# Patient Record
Sex: FEMALE | Race: WHITE | NOT HISPANIC OR LATINO | Employment: FULL TIME | URBAN - METROPOLITAN AREA
[De-identification: names, ages, dates, MRNs, and addresses within clinical notes are randomized per-mention and may not be internally consistent; named-entity substitution may affect disease eponyms.]

---

## 2023-09-14 ENCOUNTER — TELEPHONE (OUTPATIENT)
Dept: PRIMARY CARE | Facility: CLINIC | Age: 43
End: 2023-09-14
Payer: COMMERCIAL

## 2023-09-14 DIAGNOSIS — Z00.00 ROUTINE GENERAL MEDICAL EXAMINATION AT A HEALTH CARE FACILITY: Primary | ICD-10-CM

## 2023-09-14 DIAGNOSIS — E55.9 VITAMIN D DEFICIENCY: ICD-10-CM

## 2023-10-10 ENCOUNTER — CLINICAL SUPPORT (OUTPATIENT)
Dept: PRIMARY CARE | Facility: CLINIC | Age: 43
End: 2023-10-10
Payer: COMMERCIAL

## 2023-10-10 DIAGNOSIS — E55.9 VITAMIN D DEFICIENCY: ICD-10-CM

## 2023-10-10 DIAGNOSIS — Z00.00 ROUTINE GENERAL MEDICAL EXAMINATION AT A HEALTH CARE FACILITY: ICD-10-CM

## 2023-10-10 LAB
25(OH)D3 SERPL-MCNC: 51 NG/ML (ref 30–100)
ALBUMIN SERPL BCP-MCNC: 4.4 G/DL (ref 3.4–5)
ALP SERPL-CCNC: 45 U/L (ref 33–110)
ALT SERPL W P-5'-P-CCNC: 20 U/L (ref 7–45)
ANION GAP SERPL CALC-SCNC: 12 MMOL/L (ref 10–20)
AST SERPL W P-5'-P-CCNC: 18 U/L (ref 9–39)
BILIRUB SERPL-MCNC: 1.1 MG/DL (ref 0–1.2)
BUN SERPL-MCNC: 11 MG/DL (ref 6–23)
CALCIUM SERPL-MCNC: 9.6 MG/DL (ref 8.6–10.6)
CHLORIDE SERPL-SCNC: 103 MMOL/L (ref 98–107)
CHOLEST SERPL-MCNC: 252 MG/DL (ref 0–199)
CHOLESTEROL/HDL RATIO: 4.6
CO2 SERPL-SCNC: 27 MMOL/L (ref 21–32)
CREAT SERPL-MCNC: 0.82 MG/DL (ref 0.5–1.05)
ERYTHROCYTE [DISTWIDTH] IN BLOOD BY AUTOMATED COUNT: 12.3 % (ref 11.5–14.5)
EST. AVERAGE GLUCOSE BLD GHB EST-MCNC: 100 MG/DL
GFR SERPL CREATININE-BSD FRML MDRD: 90 ML/MIN/1.73M*2
GLUCOSE SERPL-MCNC: 93 MG/DL (ref 74–99)
HBA1C MFR BLD: 5.1 %
HCT VFR BLD AUTO: 42.7 % (ref 36–46)
HDLC SERPL-MCNC: 54.9 MG/DL
HGB BLD-MCNC: 13.6 G/DL (ref 12–16)
LDLC SERPL CALC-MCNC: 162 MG/DL (ref 140–190)
MCH RBC QN AUTO: 31.1 PG (ref 26–34)
MCHC RBC AUTO-ENTMCNC: 31.9 G/DL (ref 32–36)
MCV RBC AUTO: 98 FL (ref 80–100)
NON HDL CHOLESTEROL: 197 MG/DL (ref 0–149)
NRBC BLD-RTO: 0 /100 WBCS (ref 0–0)
PLATELET # BLD AUTO: 336 X10*3/UL (ref 150–450)
PMV BLD AUTO: 9.6 FL (ref 7.5–11.5)
POTASSIUM SERPL-SCNC: 4.2 MMOL/L (ref 3.5–5.3)
PROT SERPL-MCNC: 7.2 G/DL (ref 6.4–8.2)
RBC # BLD AUTO: 4.38 X10*6/UL (ref 4–5.2)
SODIUM SERPL-SCNC: 138 MMOL/L (ref 136–145)
TRIGL SERPL-MCNC: 174 MG/DL (ref 0–149)
TSH SERPL-ACNC: 2.96 MIU/L (ref 0.44–3.98)
VIT B12 SERPL-MCNC: 370 PG/ML (ref 211–911)
VLDL: 35 MG/DL (ref 0–40)
WBC # BLD AUTO: 5.7 X10*3/UL (ref 4.4–11.3)

## 2023-10-10 PROCEDURE — 83036 HEMOGLOBIN GLYCOSYLATED A1C: CPT

## 2023-10-10 PROCEDURE — 82607 VITAMIN B-12: CPT

## 2023-10-10 PROCEDURE — 36415 COLL VENOUS BLD VENIPUNCTURE: CPT

## 2023-10-10 PROCEDURE — 80053 COMPREHEN METABOLIC PANEL: CPT

## 2023-10-10 PROCEDURE — 85027 COMPLETE CBC AUTOMATED: CPT

## 2023-10-10 PROCEDURE — 84443 ASSAY THYROID STIM HORMONE: CPT

## 2023-10-10 PROCEDURE — 80061 LIPID PANEL: CPT

## 2023-10-10 PROCEDURE — 82306 VITAMIN D 25 HYDROXY: CPT

## 2023-10-11 ENCOUNTER — OFFICE VISIT (OUTPATIENT)
Dept: PRIMARY CARE | Facility: CLINIC | Age: 43
End: 2023-10-11
Payer: COMMERCIAL

## 2023-10-11 VITALS
DIASTOLIC BLOOD PRESSURE: 72 MMHG | HEIGHT: 67 IN | OXYGEN SATURATION: 98 % | SYSTOLIC BLOOD PRESSURE: 120 MMHG | WEIGHT: 161 LBS | BODY MASS INDEX: 25.27 KG/M2 | HEART RATE: 64 BPM

## 2023-10-11 DIAGNOSIS — Z23 ENCOUNTER FOR IMMUNIZATION: ICD-10-CM

## 2023-10-11 DIAGNOSIS — Z12.11 ENCOUNTER FOR SCREENING FOR MALIGNANT NEOPLASM OF COLON: ICD-10-CM

## 2023-10-11 DIAGNOSIS — E78.2 MODERATE MIXED HYPERLIPIDEMIA NOT REQUIRING STATIN THERAPY: ICD-10-CM

## 2023-10-11 DIAGNOSIS — E03.9 PRIMARY HYPOTHYROIDISM: ICD-10-CM

## 2023-10-11 DIAGNOSIS — Z12.31 VISIT FOR SCREENING MAMMOGRAM: ICD-10-CM

## 2023-10-11 DIAGNOSIS — Z00.00 ANNUAL PHYSICAL EXAM: Primary | ICD-10-CM

## 2023-10-11 PROBLEM — N92.6 IRREGULAR MENSTRUAL CYCLE: Status: ACTIVE | Noted: 2023-10-11

## 2023-10-11 PROBLEM — H72.91 TYMPANIC MEMBRANE PERFORATION, RIGHT: Status: ACTIVE | Noted: 2023-10-11

## 2023-10-11 PROCEDURE — 99396 PREV VISIT EST AGE 40-64: CPT | Performed by: FAMILY MEDICINE

## 2023-10-11 PROCEDURE — 90686 IIV4 VACC NO PRSV 0.5 ML IM: CPT | Performed by: FAMILY MEDICINE

## 2023-10-11 PROCEDURE — 36415 COLL VENOUS BLD VENIPUNCTURE: CPT

## 2023-10-11 PROCEDURE — 90471 IMMUNIZATION ADMIN: CPT | Performed by: FAMILY MEDICINE

## 2023-10-11 PROCEDURE — 82172 ASSAY OF APOLIPOPROTEIN: CPT

## 2023-10-11 PROCEDURE — 1036F TOBACCO NON-USER: CPT | Performed by: FAMILY MEDICINE

## 2023-10-11 RX ORDER — CHOLECALCIFEROL (VITAMIN D3) 25 MCG
1000 TABLET ORAL
COMMUNITY
Start: 2013-12-01

## 2023-10-11 RX ORDER — CALCIUM CARBONATE/VITAMIN D3 600 MG-10
TABLET ORAL
COMMUNITY

## 2023-10-11 RX ORDER — LEVONORGESTREL AND ETHINYL ESTRADIOL 0.15-0.03
KIT ORAL
COMMUNITY
Start: 2018-04-09 | End: 2024-03-04

## 2023-10-11 RX ORDER — LEVOTHYROXINE SODIUM 88 UG/1
1 TABLET ORAL DAILY
COMMUNITY
Start: 2014-08-05 | End: 2023-10-11 | Stop reason: SDUPTHER

## 2023-10-11 RX ORDER — LEVOTHYROXINE SODIUM 88 UG/1
88 TABLET ORAL DAILY
Qty: 90 TABLET | Refills: 3 | Status: SHIPPED | OUTPATIENT
Start: 2023-10-11 | End: 2023-11-27

## 2023-10-11 RX ORDER — MAGNESIUM GLUCONATE 27 MG(500)
27 TABLET ORAL 2 TIMES DAILY
COMMUNITY

## 2023-10-11 RX ORDER — MULTIVITAMIN
TABLET ORAL
COMMUNITY
Start: 2009-08-18

## 2023-10-11 ASSESSMENT — PATIENT HEALTH QUESTIONNAIRE - PHQ9
1. LITTLE INTEREST OR PLEASURE IN DOING THINGS: NOT AT ALL
2. FEELING DOWN, DEPRESSED OR HOPELESS: NOT AT ALL
SUM OF ALL RESPONSES TO PHQ9 QUESTIONS 1 AND 2: 0

## 2023-10-11 ASSESSMENT — COLUMBIA-SUICIDE SEVERITY RATING SCALE - C-SSRS
2. HAVE YOU ACTUALLY HAD ANY THOUGHTS OF KILLING YOURSELF?: NO
1. IN THE PAST MONTH, HAVE YOU WISHED YOU WERE DEAD OR WISHED YOU COULD GO TO SLEEP AND NOT WAKE UP?: NO

## 2023-10-11 ASSESSMENT — ENCOUNTER SYMPTOMS
OCCASIONAL FEELINGS OF UNSTEADINESS: 0
DEPRESSION: 0
LOSS OF SENSATION IN FEET: 0

## 2023-10-11 NOTE — PROGRESS NOTES
"Subjective   Patient ID: Marina Henderson is a 43 y.o. female who presents for Annual Exam.    Last Annual Physical: Sept 2022  Last Dental Visit: July 2023  Last Eye exam: Sept 2023   Hearing Concerns: No   Diet: Well- balanced   Exercise Routine: Regular exercise       HPI   The patient reports that she is taking levothyroxine as prescribed for hypothyroidism as prescribed and is tolerating it well. Blood work shows that her cholesterol is elevated and is taking oral contraceptive pills as form of birth control.     Review of Systems  Constitutional: No fever or chills, No Night Sweats  Eyes: No Blurry Vision or Eye sight problems  ENT: No Nasal Discharge, Hoarseness, sore throat  Cardiovascular: no chest pain, no palpitations and no syncope.   Respiratory: no cough, no shortness of breath during exertion and no shortness of breath at rest.   Gastrointestinal: no abdominal pain, no nausea and no vomiting.   : No vaginal discharge, burning with urination, no blood in urine or stools  Skin: No Skin rashes or Lesions  Neuro: No Headache, no dizziness or Numbness or tingling  Psych: No Anxiety, depression or sleeping problems  Heme: No Easy bleeding or brusing.     Objective   /72   Pulse 64   Ht 1.708 m (5' 7.25\")   Wt 73 kg (161 lb)   SpO2 98%   BMI 25.03 kg/m²     Physical Exam  Patient declined Chaperone  Constitutional: Alert and in no acute distress. Well developed, well nourished.   Head and Face: Head and face: Normal.    Eyes: Normal external exam. Pupils were equal in size, round, reactive to light (PERRL) with normal accommodation and extraocular movements intact (EOMI).   Ears, Nose, Mouth, and Throat: External inspection of ears and nose: Normal.  Hearing: Normal.  Nasal mucosa, septum, and turbinates: Normal.  Lips, teeth, and gums: Normal.  Oropharynx: Normal.   Neck: No neck mass was observed. Supple. Thyroid not enlarged and there were no palpable thyroid nodules.   Cardiovascular: Heart " rate and rhythm were normal, normal S1 and S2. Pedal pulses: Normal. No peripheral edema.   Pulmonary: No respiratory distress. Clear bilateral breath sounds.   Breast: Normal Appearance, No Masses or lumps palpated  Abdomen: Soft nontender; no abdominal mass palpated. Normal bowel sounds. No organomegaly.   Musculoskeletal: No joint swelling seen, normal movements of all extremities. Range of motion: Normal.  Muscle strength/tone: Normal.    Skin: Normal skin color and pigmentation, normal skin turgor, and no rash.   Neurologic: Deep tendon reflexes were 2+ and symmetric.   Psychiatric: Judgment and insight: Intact. Mood and affect: Normal.  Lymphatic: No cervical lymphadenopathy. Palpation of lymph nodes in axillae: Normal.  Palpation of lymph nodes in groin: Normal.    Lab Results   Component Value Date    WBC 5.7 10/10/2023    HGB 13.6 10/10/2023    HCT 42.7 10/10/2023     10/10/2023    CHOL 252 (H) 10/10/2023    TRIG 174 (H) 10/10/2023    HDL 54.9 10/10/2023    ALT 20 10/10/2023    AST 18 10/10/2023     10/10/2023    K 4.2 10/10/2023     10/10/2023    CREATININE 0.82 10/10/2023    BUN 11 10/10/2023    CO2 27 10/10/2023    TSH 2.96 10/10/2023    HGBA1C 5.1 10/10/2023       BI mammo bilateral screening tomosynthesis  MRN: [id number]  Patient Name: GERRY DOMINGUEZ     STUDY:  DIGITAL MAMM SCREENING W/ FREDY;  10/19/2022 8:54 am     ACCESSION NUMBER(S):  [id number]     ORDERING CLINICIAN:  SIVAKUMAR CANNON     INDICATION:  Screening. Benign right breast biopsy.Family history of breast cancer.     COMPARISON:  10/14/2021 and 10/13/2020.     FINDINGS:  2D and tomosynthesis images were reviewed at 1 mm slice thickness.     The breast tissue is heterogeneously dense, which may obscure small  masses.  A right breast biopsy clip is present. No suspicious masses  or calcifications are identified.     IMPRESSION:  No mammographic evidence of malignancy.     BI-RADS CATEGORY:     Category: 1 -  Negative.  Recommendation: 1 Year Screening.     For any future breast imaging appointments, please call [phone]).     Patient letter sent SNORM     I personally reviewed the images/study and I agree with the findings  as stated by Dr. Beltran.      Assessment/Plan   Diagnoses and all orders for this visit:  Annual physical exam  Primary hypothyroidism  -     levothyroxine (Synthroid, Levoxyl) 88 mcg tablet; Take 1 tablet (88 mcg) by mouth once daily.  Visit for screening mammogram  -     BI mammo bilateral screening tomosynthesis; Future  Encounter for immunization  -     Flu vaccine (IIV4) age 6 months and greater, preservative free  Encounter for screening for malignant neoplasm of colon  -     Colonoscopy Screening; Future  Moderate mixed hyperlipidemia not requiring statin therapy  -     Lipoprotein a; Future        Dear Marina Henderson     It was my pleasure to take care of you today in the office. Below are the things we discussed today:    1. 1. Immunizations: Yearly Flu shot is recommended. Given today         a: COVID: Please get booster from the pharmacy          b: Tetanus: Up-to-date    2. Blood Work: Reviewed   3. Seen your dentist twice a year  4. Yearly Eye exam is recommended    5. BMI: Normal  6: Diet recommendations:   Eat Clean, Try to have as many home cooked meals as possible  Avoid processed foods which contain excess calories, sugar, and sodium.    7. Exercise recommendations:   150 minutes a week to maintain your weight     If you have to loose weight, you need a better diet and exercise plan.     8. Supplements recommended:  a - Calcium 600 mg up to twice a day to get a total of 1200 mg. Each 8 oz of milk or yogurt or 1 oz of cheese, 1 Banana, 1 serving of green Leafy vegetable has about 300 mg of Calcium, so you may subtract that amount. Calcium citrate is the only acceptable supplement to take if you take an acid suppressing medication like Prilosec; otherwise Calcium  carbonate is acceptable too (It can cause Constipation).   b - Vitamin D - 2000 IU daily     9. Please get your Living will / Advance directive completed if you do not have one already. Please make sure our office has a copy of the latest one.     10. Colonoscopy: Ordered   11. Mammogram: Uptodate, ordered for Oct 2023  12. PAP: Last done in Jan 2022. Cytology and HPV negative   13. Skin Check: Please see Dermatology once a year for a Skin Check.     14. Hypothyroidism: Continue Levothyroxine.     15. Vitamin B-12 deficiency: Take over-the-counter Vitamin B-12 supplement.    16. Hyperlipidemia: Lipoprotein A ordered.    17. Birth control: Continue Ocps.    Follow up in one year for a Physical. Please call the office before your Physical to see if you need blood work completed prior to your physical.     Please call me if any questions arise from now until your next visit. I will call you after I am done seeing patients. A Doctor is always available by phone when the office is closed. Please feel free to call for help with any problem that you feel shouldn't wait until the office re-opens.     Scribe Attestation  By signing my name below, IRadha Scribe   attest that this documentation has been prepared under the direction and in the presence of Elizabeth Diaz MD.

## 2023-10-13 DIAGNOSIS — Z12.11 COLON CANCER SCREENING: ICD-10-CM

## 2023-10-13 LAB — LPA SERPL-MCNC: <6 MG/DL

## 2023-10-15 RX ORDER — POLYETHYLENE GLYCOL 3350, SODIUM SULFATE ANHYDROUS, SODIUM BICARBONATE, SODIUM CHLORIDE, POTASSIUM CHLORIDE 236; 22.74; 6.74; 5.86; 2.97 G/4L; G/4L; G/4L; G/4L; G/4L
4000 POWDER, FOR SOLUTION ORAL ONCE
Qty: 4000 ML | Refills: 0 | Status: SHIPPED | OUTPATIENT
Start: 2023-10-15 | End: 2023-10-15

## 2023-10-24 DIAGNOSIS — E78.2 MODERATE MIXED HYPERLIPIDEMIA NOT REQUIRING STATIN THERAPY: Primary | ICD-10-CM

## 2023-11-15 ENCOUNTER — ANCILLARY PROCEDURE (OUTPATIENT)
Dept: RADIOLOGY | Facility: CLINIC | Age: 43
End: 2023-11-15
Payer: COMMERCIAL

## 2023-11-15 VITALS — HEIGHT: 67 IN | WEIGHT: 160.94 LBS | BODY MASS INDEX: 25.26 KG/M2

## 2023-11-15 DIAGNOSIS — Z12.31 VISIT FOR SCREENING MAMMOGRAM: ICD-10-CM

## 2023-11-15 PROCEDURE — 77063 BREAST TOMOSYNTHESIS BI: CPT

## 2023-11-15 PROCEDURE — 77067 SCR MAMMO BI INCL CAD: CPT | Performed by: RADIOLOGY

## 2023-11-15 PROCEDURE — 77063 BREAST TOMOSYNTHESIS BI: CPT | Performed by: RADIOLOGY

## 2023-11-15 PROCEDURE — 77067 SCR MAMMO BI INCL CAD: CPT

## 2023-11-17 ENCOUNTER — APPOINTMENT (OUTPATIENT)
Dept: GASTROENTEROLOGY | Facility: EXTERNAL LOCATION | Age: 43
End: 2023-11-17
Payer: COMMERCIAL

## 2023-11-27 DIAGNOSIS — E03.9 PRIMARY HYPOTHYROIDISM: ICD-10-CM

## 2023-11-27 RX ORDER — LEVOTHYROXINE SODIUM 88 UG/1
88 TABLET ORAL DAILY
Qty: 90 TABLET | Refills: 3 | Status: SHIPPED | OUTPATIENT
Start: 2023-11-27

## 2023-12-19 DIAGNOSIS — Z12.11 COLON CANCER SCREENING: ICD-10-CM

## 2023-12-19 RX ORDER — POLYETHYLENE GLYCOL 3350, SODIUM SULFATE ANHYDROUS, SODIUM BICARBONATE, SODIUM CHLORIDE, POTASSIUM CHLORIDE 236; 22.74; 6.74; 5.86; 2.97 G/4L; G/4L; G/4L; G/4L; G/4L
4000 POWDER, FOR SOLUTION ORAL ONCE
Qty: 4000 ML | Refills: 0 | Status: SHIPPED | OUTPATIENT
Start: 2023-12-19 | End: 2023-12-19

## 2024-02-16 ENCOUNTER — APPOINTMENT (OUTPATIENT)
Dept: GASTROENTEROLOGY | Facility: EXTERNAL LOCATION | Age: 44
End: 2024-02-16
Payer: COMMERCIAL

## 2024-03-05 ENCOUNTER — TELEPHONE (OUTPATIENT)
Dept: PRIMARY CARE | Facility: CLINIC | Age: 44
End: 2024-03-05
Payer: COMMERCIAL

## 2024-03-05 DIAGNOSIS — K64.9 HEMORRHOIDS, UNSPECIFIED HEMORRHOID TYPE: Primary | ICD-10-CM

## 2024-03-05 RX ORDER — HYDROCORTISONE ACETATE 25 MG/1
25 SUPPOSITORY RECTAL 2 TIMES DAILY PRN
Qty: 30 SUPPOSITORY | Refills: 0 | Status: SHIPPED | OUTPATIENT
Start: 2024-03-05

## 2024-03-08 ENCOUNTER — OFFICE VISIT (OUTPATIENT)
Dept: GASTROENTEROLOGY | Facility: EXTERNAL LOCATION | Age: 44
End: 2024-03-08
Payer: COMMERCIAL

## 2024-03-08 DIAGNOSIS — D12.2 BENIGN NEOPLASM OF ASCENDING COLON: ICD-10-CM

## 2024-03-08 DIAGNOSIS — D12.3 BENIGN NEOPLASM OF TRANSVERSE COLON: ICD-10-CM

## 2024-03-08 DIAGNOSIS — Z12.11 ENCOUNTER FOR SCREENING FOR MALIGNANT NEOPLASM OF COLON: ICD-10-CM

## 2024-03-08 DIAGNOSIS — K64.8 PROLAPSED HEMORRHOIDS: Primary | ICD-10-CM

## 2024-03-08 PROCEDURE — 45380 COLONOSCOPY AND BIOPSY: CPT | Performed by: INTERNAL MEDICINE

## 2024-03-08 PROCEDURE — 1036F TOBACCO NON-USER: CPT | Performed by: INTERNAL MEDICINE

## 2024-03-08 PROCEDURE — 45385 COLONOSCOPY W/LESION REMOVAL: CPT | Performed by: INTERNAL MEDICINE

## 2024-03-08 PROCEDURE — 88305 TISSUE EXAM BY PATHOLOGIST: CPT | Performed by: PATHOLOGY

## 2024-03-12 ENCOUNTER — LAB REQUISITION (OUTPATIENT)
Dept: LAB | Facility: HOSPITAL | Age: 44
End: 2024-03-12
Payer: COMMERCIAL

## 2024-03-15 LAB
LABORATORY COMMENT REPORT: NORMAL
PATH REPORT.FINAL DX SPEC: NORMAL
PATH REPORT.GROSS SPEC: NORMAL
PATH REPORT.RELEVANT HX SPEC: NORMAL
PATH REPORT.TOTAL CANCER: NORMAL

## 2024-05-06 ENCOUNTER — APPOINTMENT (OUTPATIENT)
Dept: OBSTETRICS AND GYNECOLOGY | Facility: CLINIC | Age: 44
End: 2024-05-06
Payer: COMMERCIAL

## 2024-05-06 ENCOUNTER — TELEPHONE (OUTPATIENT)
Dept: OBSTETRICS AND GYNECOLOGY | Facility: CLINIC | Age: 44
End: 2024-05-06

## 2024-05-06 DIAGNOSIS — N92.6 IRREGULAR MENSTRUAL CYCLE: ICD-10-CM

## 2024-05-06 RX ORDER — LEVONORGESTREL AND ETHINYL ESTRADIOL 0.15-0.03
1 KIT ORAL DAILY
Qty: 112 TABLET | Refills: 0 | Status: SHIPPED | OUTPATIENT
Start: 2024-05-06 | End: 2024-05-29

## 2024-05-06 NOTE — TELEPHONE ENCOUNTER
Patients appointment was rescheduled. She is requesting a refill on her birth control, she states that she will run out before her next appointment.

## 2024-05-13 ENCOUNTER — LAB (OUTPATIENT)
Dept: LAB | Facility: LAB | Age: 44
End: 2024-05-13
Payer: COMMERCIAL

## 2024-05-13 DIAGNOSIS — E78.2 MODERATE MIXED HYPERLIPIDEMIA NOT REQUIRING STATIN THERAPY: ICD-10-CM

## 2024-05-13 LAB
CHOLEST SERPL-MCNC: 187 MG/DL (ref 0–199)
CHOLESTEROL/HDL RATIO: 3.8
HDLC SERPL-MCNC: 49.7 MG/DL
LDLC SERPL CALC-MCNC: 118 MG/DL
NON HDL CHOLESTEROL: 137 MG/DL (ref 0–149)
TRIGL SERPL-MCNC: 98 MG/DL (ref 0–149)
VLDL: 20 MG/DL (ref 0–40)

## 2024-05-13 PROCEDURE — 36415 COLL VENOUS BLD VENIPUNCTURE: CPT

## 2024-05-13 PROCEDURE — 80061 LIPID PANEL: CPT

## 2024-07-24 ENCOUNTER — APPOINTMENT (OUTPATIENT)
Dept: OBSTETRICS AND GYNECOLOGY | Facility: CLINIC | Age: 44
End: 2024-07-24
Payer: COMMERCIAL

## 2024-07-24 VITALS
HEIGHT: 67 IN | SYSTOLIC BLOOD PRESSURE: 108 MMHG | BODY MASS INDEX: 25.58 KG/M2 | WEIGHT: 163 LBS | DIASTOLIC BLOOD PRESSURE: 67 MMHG

## 2024-07-24 DIAGNOSIS — N92.6 IRREGULAR MENSTRUAL CYCLE: ICD-10-CM

## 2024-07-24 DIAGNOSIS — Z01.419 WELL WOMAN EXAM: Primary | ICD-10-CM

## 2024-07-24 PROCEDURE — 99396 PREV VISIT EST AGE 40-64: CPT | Performed by: OBSTETRICS & GYNECOLOGY

## 2024-07-24 PROCEDURE — 3008F BODY MASS INDEX DOCD: CPT | Performed by: OBSTETRICS & GYNECOLOGY

## 2024-07-24 PROCEDURE — 1036F TOBACCO NON-USER: CPT | Performed by: OBSTETRICS & GYNECOLOGY

## 2024-07-24 RX ORDER — LEVONORGESTREL AND ETHINYL ESTRADIOL 0.15-0.03
1 KIT ORAL DAILY
Qty: 112 TABLET | Refills: 3 | Status: SHIPPED | OUTPATIENT
Start: 2024-07-24 | End: 2025-07-24

## 2024-07-24 RX ORDER — MULTIVITAMIN/IRON/FOLIC ACID 18MG-0.4MG
1 TABLET ORAL DAILY
COMMUNITY

## 2024-07-24 ASSESSMENT — PAIN SCALES - GENERAL: PAINLEVEL: 0-NO PAIN

## 2024-07-24 ASSESSMENT — ENCOUNTER SYMPTOMS
VOMITING: 0
CONSTIPATION: 0
SLEEP DISTURBANCE: 0
DYSURIA: 0
ABDOMINAL PAIN: 0
HEMATURIA: 0
ABDOMINAL DISTENTION: 0
DIARRHEA: 0
FREQUENCY: 0
CHILLS: 0
BACK PAIN: 0
FEVER: 0
FLANK PAIN: 0
SHORTNESS OF BREATH: 0
UNEXPECTED WEIGHT CHANGE: 0
NAUSEA: 0
COLOR CHANGE: 0
FATIGUE: 0
BLOOD IN STOOL: 0
APPETITE CHANGE: 0

## 2024-07-24 NOTE — PROGRESS NOTES
"Marina Henderson is a 43 y.o.  here for well woman exam.    Past med hx and past surg hx reviewed with patient and notable for: no new issues  Hast noticed she feels warm more often and some increased vaginal dryness. Using OTC lubricant as needed which is helpful.    Exercise: regular     GynHx:  Cycles: cont MARLINE   Sexually active: Yes with one male partner/   Contraception: MARLINE  Patient's last menstrual period was 07/10/2024 (exact date).     OB History          2    Para   2    Term                AB        Living   2         SAB        IAB        Ectopic        Multiple        Live Births                     Objective     Review of Systems   Constitutional:  Negative for appetite change, chills, fatigue, fever and unexpected weight change.   Respiratory:  Negative for shortness of breath.    Cardiovascular:  Negative for chest pain.   Gastrointestinal:  Negative for abdominal distention, abdominal pain, blood in stool, constipation, diarrhea, nausea and vomiting.   Endocrine: Negative for cold intolerance and heat intolerance.   Genitourinary:  Negative for dyspareunia, dysuria, flank pain, frequency, genital sores, hematuria, menstrual problem, pelvic pain, urgency, vaginal bleeding, vaginal discharge and vaginal pain.   Musculoskeletal:  Negative for back pain.   Skin:  Negative for color change.   Psychiatric/Behavioral:  Negative for sleep disturbance.        /67 (BP Location: Left arm, Patient Position: Sitting)   Ht 1.702 m (5' 7\")   Wt 73.9 kg (163 lb)   LMP 07/10/2024 (Exact Date)   BMI 25.53 kg/m²     Physical Exam  Constitutional:       Appearance: Normal appearance.   HENT:      Head: Normocephalic and atraumatic.   Chest:   Breasts:     Right: Normal.      Left: Normal.   Abdominal:      General: Abdomen is flat.      Palpations: Abdomen is soft.      Tenderness: There is no abdominal tenderness.   Genitourinary:     General: Normal vulva.      Vagina: Normal.      " Cervix: Normal.      Uterus: Normal.       Adnexa: Right adnexa normal and left adnexa normal.   Skin:     General: Skin is warm and dry.   Neurological:      Mental Status: She is alert and oriented to person, place, and time.   Psychiatric:         Mood and Affect: Mood normal.          Assessment and Plan:  Routine Well Woman Exam Today.   Discussed diet and exercise and routine health screening.   Pap: pap 2022 wnl   Recommend annual mammograms.  Last mammogram 11/2023  Currently up to date on colon cancer screening.         No orders of the defined types were placed in this encounter.

## 2024-10-09 DIAGNOSIS — E55.9 VITAMIN D DEFICIENCY: ICD-10-CM

## 2024-10-09 DIAGNOSIS — Z00.00 ROUTINE GENERAL MEDICAL EXAMINATION AT A HEALTH CARE FACILITY: Primary | ICD-10-CM

## 2024-10-10 ENCOUNTER — LAB (OUTPATIENT)
Dept: LAB | Facility: LAB | Age: 44
End: 2024-10-10
Payer: COMMERCIAL

## 2024-10-10 DIAGNOSIS — Z00.00 ROUTINE GENERAL MEDICAL EXAMINATION AT A HEALTH CARE FACILITY: ICD-10-CM

## 2024-10-10 DIAGNOSIS — E55.9 VITAMIN D DEFICIENCY: ICD-10-CM

## 2024-10-10 LAB
25(OH)D3 SERPL-MCNC: 62 NG/ML (ref 30–100)
ALBUMIN SERPL BCP-MCNC: 4.4 G/DL (ref 3.4–5)
ALP SERPL-CCNC: 43 U/L (ref 33–110)
ALT SERPL W P-5'-P-CCNC: 24 U/L (ref 7–45)
ANION GAP SERPL CALC-SCNC: 11 MMOL/L (ref 10–20)
AST SERPL W P-5'-P-CCNC: 21 U/L (ref 9–39)
BILIRUB SERPL-MCNC: 0.8 MG/DL (ref 0–1.2)
BUN SERPL-MCNC: 12 MG/DL (ref 6–23)
CALCIUM SERPL-MCNC: 9.8 MG/DL (ref 8.6–10.6)
CHLORIDE SERPL-SCNC: 105 MMOL/L (ref 98–107)
CHOLEST SERPL-MCNC: 223 MG/DL (ref 0–199)
CHOLESTEROL/HDL RATIO: 4.1
CO2 SERPL-SCNC: 29 MMOL/L (ref 21–32)
CREAT SERPL-MCNC: 0.8 MG/DL (ref 0.5–1.05)
EGFRCR SERPLBLD CKD-EPI 2021: >90 ML/MIN/1.73M*2
ERYTHROCYTE [DISTWIDTH] IN BLOOD BY AUTOMATED COUNT: 12.4 % (ref 11.5–14.5)
EST. AVERAGE GLUCOSE BLD GHB EST-MCNC: 97 MG/DL
GLUCOSE SERPL-MCNC: 98 MG/DL (ref 74–99)
HBA1C MFR BLD: 5 %
HCT VFR BLD AUTO: 43.4 % (ref 36–46)
HDLC SERPL-MCNC: 54 MG/DL
HGB BLD-MCNC: 14 G/DL (ref 12–16)
LDLC SERPL CALC-MCNC: 138 MG/DL
MCH RBC QN AUTO: 30.6 PG (ref 26–34)
MCHC RBC AUTO-ENTMCNC: 32.3 G/DL (ref 32–36)
MCV RBC AUTO: 95 FL (ref 80–100)
NON HDL CHOLESTEROL: 169 MG/DL (ref 0–149)
NRBC BLD-RTO: 0 /100 WBCS (ref 0–0)
PLATELET # BLD AUTO: 326 X10*3/UL (ref 150–450)
POTASSIUM SERPL-SCNC: 4.8 MMOL/L (ref 3.5–5.3)
PROT SERPL-MCNC: 7.1 G/DL (ref 6.4–8.2)
RBC # BLD AUTO: 4.57 X10*6/UL (ref 4–5.2)
SODIUM SERPL-SCNC: 140 MMOL/L (ref 136–145)
TRIGL SERPL-MCNC: 154 MG/DL (ref 0–149)
TSH SERPL-ACNC: 2.76 MIU/L (ref 0.44–3.98)
VIT B12 SERPL-MCNC: 466 PG/ML (ref 211–911)
VLDL: 31 MG/DL (ref 0–40)
WBC # BLD AUTO: 4.5 X10*3/UL (ref 4.4–11.3)

## 2024-10-10 PROCEDURE — 85027 COMPLETE CBC AUTOMATED: CPT

## 2024-10-10 PROCEDURE — 36415 COLL VENOUS BLD VENIPUNCTURE: CPT

## 2024-10-10 PROCEDURE — 82306 VITAMIN D 25 HYDROXY: CPT

## 2024-10-10 PROCEDURE — 80053 COMPREHEN METABOLIC PANEL: CPT

## 2024-10-10 PROCEDURE — 82607 VITAMIN B-12: CPT

## 2024-10-10 PROCEDURE — 84443 ASSAY THYROID STIM HORMONE: CPT

## 2024-10-10 PROCEDURE — 83036 HEMOGLOBIN GLYCOSYLATED A1C: CPT

## 2024-10-10 PROCEDURE — 80061 LIPID PANEL: CPT

## 2024-10-14 ENCOUNTER — APPOINTMENT (OUTPATIENT)
Dept: PRIMARY CARE | Facility: CLINIC | Age: 44
End: 2024-10-14
Payer: COMMERCIAL

## 2024-10-14 VITALS
HEIGHT: 67 IN | BODY MASS INDEX: 25.9 KG/M2 | OXYGEN SATURATION: 97 % | DIASTOLIC BLOOD PRESSURE: 76 MMHG | HEART RATE: 54 BPM | WEIGHT: 165 LBS | SYSTOLIC BLOOD PRESSURE: 122 MMHG

## 2024-10-14 DIAGNOSIS — E03.9 PRIMARY HYPOTHYROIDISM: ICD-10-CM

## 2024-10-14 DIAGNOSIS — Z23 ENCOUNTER FOR IMMUNIZATION: ICD-10-CM

## 2024-10-14 DIAGNOSIS — Z12.31 VISIT FOR SCREENING MAMMOGRAM: ICD-10-CM

## 2024-10-14 DIAGNOSIS — E78.2 MODERATE MIXED HYPERLIPIDEMIA NOT REQUIRING STATIN THERAPY: ICD-10-CM

## 2024-10-14 DIAGNOSIS — E55.9 VITAMIN D DEFICIENCY: ICD-10-CM

## 2024-10-14 DIAGNOSIS — Z00.00 ANNUAL PHYSICAL EXAM: Primary | ICD-10-CM

## 2024-10-14 PROCEDURE — 3008F BODY MASS INDEX DOCD: CPT | Performed by: FAMILY MEDICINE

## 2024-10-14 PROCEDURE — 90471 IMMUNIZATION ADMIN: CPT | Performed by: FAMILY MEDICINE

## 2024-10-14 PROCEDURE — 99396 PREV VISIT EST AGE 40-64: CPT | Performed by: FAMILY MEDICINE

## 2024-10-14 PROCEDURE — 1036F TOBACCO NON-USER: CPT | Performed by: FAMILY MEDICINE

## 2024-10-14 PROCEDURE — 90656 IIV3 VACC NO PRSV 0.5 ML IM: CPT | Performed by: FAMILY MEDICINE

## 2024-10-14 RX ORDER — LEVOTHYROXINE SODIUM 88 UG/1
88 TABLET ORAL DAILY
Qty: 90 TABLET | Refills: 3 | Status: SHIPPED | OUTPATIENT
Start: 2024-10-14

## 2024-10-14 ASSESSMENT — PATIENT HEALTH QUESTIONNAIRE - PHQ9
2. FEELING DOWN, DEPRESSED OR HOPELESS: NOT AT ALL
SUM OF ALL RESPONSES TO PHQ9 QUESTIONS 1 AND 2: 0
1. LITTLE INTEREST OR PLEASURE IN DOING THINGS: NOT AT ALL

## 2024-10-14 NOTE — PROGRESS NOTES
"Subjective   Patient ID: Marina Henderson is a 44 y.o. female who presents for Annual Exam.    Last Annual Physical: Oct 2023   Last Dental Visit: Up-to-date   Last Eye exam: Up-to-date   Hearing Concerns:No   Diet: Well- balanced   Exercise Routine: Regular exercise       HPI   The patient states that she is taking levothyroxine for hypothyroidism as prescribed and her labs are stable at this time. She is also taking OCPs as form of birth control.    Review of Systems  Constitutional: No fever or chills, No Night Sweats  Eyes: No Blurry Vision or Eye sight problems  ENT: No Nasal Discharge, Hoarseness, sore throat  Cardiovascular: no chest pain, no palpitations and no syncope.   Respiratory: no cough, no shortness of breath during exertion and no shortness of breath at rest.   Gastrointestinal: no abdominal pain, no nausea and no vomiting.   : No vaginal discharge, burning with urination, no blood in urine or stools  Skin: No Skin rashes or Lesions  Neuro: No Headache, no dizziness or Numbness or tingling  Psych: No Anxiety, depression or sleeping problems  Heme: No Easy bleeding or brusing.     Objective   /76   Pulse 54   Ht 1.702 m (5' 7\")   Wt 74.8 kg (165 lb)   SpO2 97%   BMI 25.84 kg/m²     Physical Exam  Constitutional: Alert and in no acute distress. Well developed, well nourished.   Head and Face: Head and face: Normal.    Eyes: Normal external exam. Pupils were equal in size, round, reactive to light (PERRL) with normal accommodation and extraocular movements intact (EOMI).   Ears, Nose, Mouth, and Throat: External inspection of ears and nose: Normal.  Hearing: Normal.  Nasal mucosa, septum, and turbinates: Normal.  Lips, teeth, and gums: Normal.  Oropharynx: Normal.   Neck: No neck mass was observed. Supple. Thyroid not enlarged and there were no palpable thyroid nodules.   Cardiovascular: Heart rate and rhythm were normal, normal S1 and S2. Pedal pulses: Normal. No peripheral edema. "   Pulmonary: No respiratory distress. Clear bilateral breath sounds.   Abdomen: Soft nontender; no abdominal mass palpated. Normal bowel sounds. No organomegaly.   Musculoskeletal: No joint swelling seen, normal movements of all extremities. Range of motion: Normal.  Muscle strength/tone: Normal.    Skin: Normal skin color and pigmentation, normal skin turgor, and no rash.   Neurologic: Deep tendon reflexes were 2+ and symmetric.   Psychiatric: Judgment and insight: Intact. Mood and affect: Normal.  Lymphatic: No cervical lymphadenopathy. Palpation of lymph nodes in axillae: Normal.  Palpation of lymph nodes in groin: Normal.    Lab Results   Component Value Date    WBC 4.5 10/10/2024    HGB 14.0 10/10/2024    HCT 43.4 10/10/2024     10/10/2024    CHOL 223 (H) 10/10/2024    TRIG 154 (H) 10/10/2024    HDL 54.0 10/10/2024    ALT 24 10/10/2024    AST 21 10/10/2024     10/10/2024    K 4.8 10/10/2024     10/10/2024    CREATININE 0.80 10/10/2024    BUN 12 10/10/2024    CO2 29 10/10/2024    TSH 2.76 10/10/2024    HGBA1C 5.0 10/10/2024       BI mammo bilateral screening tomosynthesis  Narrative: Interpreted By:  Aranza Rosas,   STUDY:  BI MAMMO BILATERAL SCREENING TOMOSYNTHESIS;  11/15/2023 2:17 pm      ACCESSION NUMBER(S):  [id number]      ORDERING CLINICIAN:  SIVAKUMAR CANNON      INDICATION:  Screening.      History of a benign right breast biopsy. Family history of breast  cancer with her mother diagnosed at age 50.      COMPARISON:  10/19/2022, 10/14/2021, 10/13/2020, 08/20/2018      FINDINGS:  2D and tomosynthesis images were reviewed at 1 mm slice thickness.      Density:  The breast tissue is heterogeneously dense, which may  obscure small masses.      A biopsy marking clip is noted in the upper-outer quadrant of the  right breast at posterior depth. No suspicious masses or  calcifications are identified.      Impression: No mammographic evidence of malignancy.      BI-RADS CATEGORY:      BI-RADS  Category:  2 Benign.  Recommendation:  Routine Screening Mammogram in 1 Year.  Recommended Date:  1 Year.  Laterality:  Bilateral.      For any future breast imaging appointments, please call [phone]).          MACRO:  None      Signed by: Aranza Rosas 11/19/2023 11:27 AM  Dictation workstation:   [id number]      Assessment/Plan   Diagnoses and all orders for this visit:  Annual physical exam  -     CBC; Future  -     Comprehensive Metabolic Panel; Future  -     Hemoglobin A1C; Future  -     Lipid Panel; Future  -     TSH with reflex to Free T4 if abnormal; Future  -     Follow Up In [facility] - PCP - Health Maintenance; Future  Primary hypothyroidism  -     levothyroxine (Synthroid, Levoxyl) 88 mcg tablet; Take 1 tablet (88 mcg) by mouth once daily. as directed  Encounter for immunization  -     Flu vaccine, trivalent, preservative free, age 6 months and greater (Fluarix/Fluzone/Flulaval)  Visit for screening mammogram  -     BI mammo bilateral screening tomosynthesis; Future  Moderate mixed hyperlipidemia not requiring statin therapy  -     Lipid Panel; Future  Vitamin D deficiency  -     Vitamin D 25-Hydroxy,Total (for eval of Vitamin D levels); Future  -     Vitamin B12; Future        Dear Marina Henderson     It was my pleasure to take care of you today in the office. Below are the things we discussed today:    1. 1. Immunizations: Yearly Flu shot is recommended. Given today          a: COVID: Please get booster from the pharmacy          b: Tetanus: Up-to-date    2. Blood Work: Reviewed  3. Seen your dentist twice a year  4. Yearly Eye exam is recommended    5. BMI: Overweight   6: Diet recommendations:   Eat Clean, Try to have as many home cooked meals as possible  Avoid processed foods which contain excess calories, sugar, and sodium.    7. Exercise recommendations:   150 minutes a week to maintain your weight     If you have to loose weight, you need a better diet and exercise  plan.     8. Supplements recommended:  a - Calcium 600 mg up to twice a day to get a total of 1200 mg. Each 8 oz of milk or yogurt or 1 oz of cheese, 1 Banana, 1 serving of green Leafy vegetable has about 300 mg of Calcium, so you may subtract that amount. Calcium citrate is the only acceptable supplement to take if you take an acid suppressing medication like Prilosec; otherwise Calcium carbonate is acceptable too (It can cause Constipation).   b - Vitamin D - 2000 IU daily     9. Please get your Living will / Advance directive completed if you do not have one already. Please make sure our office has a copy of the latest one.     10. Colonoscopy: Uptodate. Last done in March 2024, repeat in March 2028  11. Mammogram: Uptodate, ordered for Nov 2024  12. PAP: Up-to-date. Last done in Jan 2022. Cytology and HPV negative   13. Skin Check: Please see Dermatology once a year for a Skin Check.     14. Hypothyroidism: Stable. Continue levothyroxine.    15. Birth control counseling: The patient is taking OCPs.    16. Hyperlipidemia: Low lipoprotein A. Encouraged her to work on her diet.    Follow up in one year for a Physical. Please call the office before your Physical to see if you need blood work completed prior to your physical.     Please call me if any questions arise from now until your next visit. I will call you after I am done seeing patients. A Doctor is always available by phone when the office is closed. Please feel free to call for help with any problem that you feel shouldn't wait until the office re-opens.     Scribe Attestation  By signing my name below, IRadha Scribe   attest that this documentation has been prepared under the direction and in the presence of Elizabeth Diaz MD.

## 2024-11-15 ENCOUNTER — HOSPITAL ENCOUNTER (OUTPATIENT)
Dept: RADIOLOGY | Facility: CLINIC | Age: 44
Discharge: HOME | End: 2024-11-15
Payer: COMMERCIAL

## 2024-11-15 VITALS — WEIGHT: 164.9 LBS | BODY MASS INDEX: 25.88 KG/M2 | HEIGHT: 67 IN

## 2024-11-15 DIAGNOSIS — Z12.31 VISIT FOR SCREENING MAMMOGRAM: ICD-10-CM

## 2024-11-15 PROCEDURE — 77067 SCR MAMMO BI INCL CAD: CPT

## 2025-05-29 LAB
CHOLEST SERPL-MCNC: 224 MG/DL
CHOLEST/HDLC SERPL: 4 (CALC)
HDLC SERPL-MCNC: 56 MG/DL
LDLC SERPL CALC-MCNC: 142 MG/DL (CALC)
NONHDLC SERPL-MCNC: 168 MG/DL (CALC)
TRIGL SERPL-MCNC: 136 MG/DL

## 2025-06-09 ENCOUNTER — APPOINTMENT (OUTPATIENT)
Dept: PRIMARY CARE | Facility: CLINIC | Age: 45
End: 2025-06-09
Payer: COMMERCIAL

## 2025-06-09 VITALS
WEIGHT: 166 LBS | DIASTOLIC BLOOD PRESSURE: 90 MMHG | HEART RATE: 60 BPM | SYSTOLIC BLOOD PRESSURE: 143 MMHG | BODY MASS INDEX: 26.06 KG/M2 | HEIGHT: 67 IN | OXYGEN SATURATION: 98 %

## 2025-06-09 DIAGNOSIS — E78.2 MODERATE MIXED HYPERLIPIDEMIA NOT REQUIRING STATIN THERAPY: ICD-10-CM

## 2025-06-09 DIAGNOSIS — E03.9 PRIMARY HYPOTHYROIDISM: Primary | ICD-10-CM

## 2025-06-09 PROCEDURE — 1036F TOBACCO NON-USER: CPT | Performed by: FAMILY MEDICINE

## 2025-06-09 PROCEDURE — 3008F BODY MASS INDEX DOCD: CPT | Performed by: FAMILY MEDICINE

## 2025-06-09 PROCEDURE — 99213 OFFICE O/P EST LOW 20 MIN: CPT | Performed by: FAMILY MEDICINE

## 2025-06-09 NOTE — PROGRESS NOTES
"Subjective   Patient ID: Marina Henderson is a 44 y.o. female who presents for Follow-up.    HPI   The patient states that she is taking levothyroxine for hypothyroidism as prescribed and her labs are stable at this time. She is also taking OCPs as form of birth control     She is hear to discuss test results. Cholesterol is still high.  increased from prior 118. She feels she is perimenopausal and is on birth control. Denies any symptoms.  She notes no change in her diet or medications and she reports no issues with her thyroid medications. She mentions her father passed away at 59 from a heart attack.          Review of Systems  Constitutional: No fever or chills  Cardiovascular: no chest pain, no palpitations and no syncope.   Respiratory: no cough, no shortness of breath during exertion and no shortness of breath at rest.   Gastrointestinal: no abdominal pain, no nausea and no vomiting.  Neuro: No Headache, no dizziness    Objective   /90   Pulse 60   Ht 1.707 m (5' 7.21\")   Wt 75.3 kg (166 lb)   SpO2 98%   BMI 25.84 kg/m²     Physical Exam  Constitutional: Alert and in no acute distress. Well developed, well nourished  Head and Face: Head and face: Normal.    Cardiovascular: Heart rate and rhythm were normal, normal S1 and S2. No peripheral edema.   Pulmonary: No respiratory distress. Clear bilateral breath sounds.  Musculoskeletal: Gait and station: Normal. Muscle strength/tone: Normal.   Skin: Normal skin color and pigmentation, normal skin turgor, and no rash.    Psychiatric: Judgment and insight: Intact. Mood and affect: Normal.      Lab Results   Component Value Date    WBC 4.5 10/10/2024    HGB 14.0 10/10/2024    HCT 43.4 10/10/2024     10/10/2024    CHOL 224 (H) 05/29/2025    TRIG 136 05/29/2025    HDL 56 05/29/2025    ALT 24 10/10/2024    AST 21 10/10/2024     10/10/2024    K 4.8 10/10/2024     10/10/2024    CREATININE 0.80 10/10/2024    BUN 12 10/10/2024    CO2 29 " 10/10/2024    TSH 2.76 10/10/2024    HGBA1C 5.0 10/10/2024       BI mammo bilateral screening tomosynthesis  Narrative: Interpreted By:  Tamica Campos,   STUDY:  BI MAMMO BILATERAL SCREENING TOMOSYNTHESIS;  11/15/2024 2:14 pm      ACCESSION NUMBER(S):  [id number]      ORDERING CLINICIAN:  SIVAKUMAR CANNON      INDICATION:  Screening.      COMPARISON:  All prior mammograms that are available at the time of interpretation.      FINDINGS:  2D and tomosynthesis images were reviewed at 1 mm slice thickness.      Density:  The breasts are heterogeneously dense, which may obscure  small masses.      A biopsy tissue marker is again seen in the superior central right  breast at middle to posterior depth. No suspicious masses or  calcifications are identified in either breast.      Impression: No mammographic evidence of malignancy.      BI-RADS CATEGORY:  BI-RADS Category:  1 Negative.  Recommendation:  Annual Screening.  Recommended Date:  1 Year.  Laterality:  Bilateral.      Based on the Tyrer-Cuzick model for breast cancer risk assessment,  this patient is at an elevated risk of developing breast cancer and  may benefit from additional screening with breast MRI or ultrasound.  Please note that this estimate is based on responses provided on the  patient questionnaire. For more information regarding high risk  consultation, please call [phone].      For any future breast imaging appointments, please call [phone]).          MACRO:  None      Signed by: Tamica Campos 11/21/2024 6:02 PM  Dictation workstation:   [id number]      Assessment/Plan   Assessment & Plan  Primary hypothyroidism  Stable. Continue levothyroxine.        Moderate mixed hyperlipidemia not requiring statin therapy   increased from prior 118. Recommend to follow a plant based diet and avoid animal products, meat, egg yoke, shell fish, etc. Will recheck numbers before next visit.                      Your yearly Physical  is due in: Oct 2025  When you call the office for your yearly Physical, please ask them to inform me to order your blood work, so that you can get the fasting blood work before your appointment and we can discuss the results at your physical.      Please call me if any questions arise from now until your next visit. I will call you after I am done seeing patients. A Doctor is always available by phone when the office is closed. Please feel free to call for help with any problem that you feel shouldn't wait until the office re-opens.     I, Elizabeth Diaz MD, attest that this note for 6/9/2025 accurately reflects documentation that my scribe Terry Mcghee, made at my direction in my capacity as Elizabeth Diaz MD when I treated Marina Henderson.      Scribe Attestation  By signing my name below, Terry CUELLO Scribe   attest that this documentation has been prepared under the direction and in the presence of Elizabeth Diaz MD.

## 2025-07-11 DIAGNOSIS — N92.6 IRREGULAR MENSTRUAL CYCLE: ICD-10-CM

## 2025-07-11 RX ORDER — LEVONORGESTREL AND ETHINYL ESTRADIOL 0.15-0.03
1 KIT ORAL DAILY
Qty: 84 TABLET | Refills: 0 | Status: SHIPPED | OUTPATIENT
Start: 2025-07-11

## 2025-07-11 NOTE — TELEPHONE ENCOUNTER
Called patient informed her that her script was sent in for a 90 day supply.     English Carroll

## 2025-07-11 NOTE — TELEPHONE ENCOUNTER
Refill request for levonorgestreL-ethinyl estrad ([place], 28,) 0.15-0.03 mg tablet   Last ordered 8/13/24 84 day supply with 3 refills  Last seen 7/24/24  Called patient and LVM to return call to schedule an appointment for an annual.    English Carroll

## 2025-07-11 NOTE — TELEPHONE ENCOUNTER
Received call from patient. She is scheduled for an annual exam on 9/24/25.  Patient would like to confirm that her prescription has been fiiled.

## 2025-09-24 ENCOUNTER — APPOINTMENT (OUTPATIENT)
Facility: CLINIC | Age: 45
End: 2025-09-24
Payer: COMMERCIAL

## 2025-10-14 ENCOUNTER — APPOINTMENT (OUTPATIENT)
Dept: PRIMARY CARE | Facility: CLINIC | Age: 45
End: 2025-10-14
Payer: COMMERCIAL

## 2025-12-08 ENCOUNTER — APPOINTMENT (OUTPATIENT)
Dept: PRIMARY CARE | Facility: CLINIC | Age: 45
End: 2025-12-08
Payer: COMMERCIAL